# Patient Record
Sex: MALE
[De-identification: names, ages, dates, MRNs, and addresses within clinical notes are randomized per-mention and may not be internally consistent; named-entity substitution may affect disease eponyms.]

---

## 2017-04-10 ENCOUNTER — RX ONLY (OUTPATIENT)
Age: 37
Setting detail: RX ONLY
End: 2017-04-10

## 2018-01-17 ENCOUNTER — RX ONLY (OUTPATIENT)
Age: 38
Setting detail: RX ONLY
End: 2018-01-17

## 2018-01-17 RX ORDER — DUPILUMAB 300 MG/2ML
INJECTION, SOLUTION SUBCUTANEOUS
Qty: 12 | Refills: 3 | Status: CANCELLED
Stop reason: CLARIF

## 2021-11-01 ENCOUNTER — APPOINTMENT (RX ONLY)
Dept: URBAN - METROPOLITAN AREA CLINIC 111 | Facility: CLINIC | Age: 41
Setting detail: DERMATOLOGY
End: 2021-11-01

## 2021-11-01 DIAGNOSIS — L20.89 OTHER ATOPIC DERMATITIS: ICD-10-CM

## 2021-11-01 PROBLEM — B00.9 HERPESVIRAL INFECTION, UNSPECIFIED: Status: ACTIVE | Noted: 2021-11-01

## 2021-11-01 PROBLEM — L70.8 OTHER ACNE: Status: ACTIVE | Noted: 2021-11-01

## 2021-11-01 PROBLEM — L2084 OTHER ATOPIC DERMATITIS AND RELATED CONDITIONS: Status: ACTIVE | Noted: 2021-11-01

## 2021-11-01 PROBLEM — L200 OTHER ATOPIC DERMATITIS AND RELATED CONDITIONS: Status: ACTIVE | Noted: 2021-11-01

## 2021-11-01 PROBLEM — L2081 OTHER ATOPIC DERMATITIS AND RELATED CONDITIONS: Status: ACTIVE | Noted: 2021-11-01

## 2021-11-01 PROBLEM — L2089 OTHER ATOPIC DERMATITIS AND RELATED CONDITIONS: Status: ACTIVE | Noted: 2021-11-01

## 2021-11-01 PROBLEM — L2082 OTHER ATOPIC DERMATITIS AND RELATED CONDITIONS: Status: ACTIVE | Noted: 2021-11-01

## 2021-11-01 PROBLEM — L20.82 FLEXURAL ECZEMA: Status: ACTIVE | Noted: 2021-11-01

## 2021-11-01 PROCEDURE — ? TREATMENT REGIMEN

## 2021-11-01 PROCEDURE — ? COUNSELING

## 2021-11-01 PROCEDURE — 99203 OFFICE O/P NEW LOW 30 MIN: CPT | Mod: 95

## 2021-11-01 PROCEDURE — ? BLEACH BATH INSTRUCTIONS

## 2021-11-01 ASSESSMENT — LOCATION SIMPLE DESCRIPTION DERM
LOCATION SIMPLE: RIGHT UPPER BACK
LOCATION SIMPLE: ABDOMEN

## 2021-11-01 ASSESSMENT — LOCATION DETAILED DESCRIPTION DERM
LOCATION DETAILED: RIGHT MEDIAL UPPER BACK
LOCATION DETAILED: PERIUMBILICAL SKIN

## 2021-11-01 ASSESSMENT — LOCATION ZONE DERM: LOCATION ZONE: TRUNK

## 2021-11-01 ASSESSMENT — BSA ECZEMA: % BODY COVERED IN ECZEMA: 0

## 2021-11-01 NOTE — PROCEDURE: BLEACH BATH INSTRUCTIONS
[facility]: I recommended weekly bleach baths. This is accomplished by filling a bathtub with warm water and then adding in 1/4 cup of bleach. The patient should then soak for 15-20 minutes and then rinse off.  Mother understands this is a dilute bleach bath and the use appropriate amt of bleach for less water in the tub.
Detail Level: Detailed

## 2021-11-01 NOTE — HPI: MEDICATION (DUPIXENT)
Is This A New Presentation, Or A Follow-Up?: Evaluation for Dupixent Therapy
What Type Of Rash Do You Have?: atopic dermatitis

## 2021-11-01 NOTE — PROCEDURE: TREATMENT REGIMEN
Continue Regimen: Dupixent injection
Plan: Pt wanting rx professional opinion in treatment regards to help with condition \\n\\nPt states fungal rash noted to face , pt bring rx by Dupixent by another prescriber in [place]. \\n\\nPt states has stopped Dupixent x 2 while in [place], pt did noticed flare ups increase within 3 months , to monitor effectiveness. \\nDid inform to pt will reach max level of effectiveness and may have to have alternative topical therapy \\n\\nAlso noted genital involvement, pt states rash  burn and be irritated. No need for lab work \\nAlso informed in regards to cellcept that does take time for effective treatment.\\nPt states being treated with topical therapy\\nInformed pt to consider taking methotrexate rx.
Detail Level: Zone
Otc Regimen: Bleach baths informed for pt.\\nPt states patch testing has been performed for pollen and dust particles